# Patient Record
Sex: FEMALE | Race: WHITE | HISPANIC OR LATINO | Employment: UNEMPLOYED | URBAN - METROPOLITAN AREA
[De-identification: names, ages, dates, MRNs, and addresses within clinical notes are randomized per-mention and may not be internally consistent; named-entity substitution may affect disease eponyms.]

---

## 2023-08-25 ENCOUNTER — TELEPHONE (OUTPATIENT)
Dept: OPHTHALMOLOGY | Facility: CLINIC | Age: 28
End: 2023-08-25

## 2023-08-28 ENCOUNTER — TELEPHONE (OUTPATIENT)
Dept: OPHTHALMOLOGY | Facility: CLINIC | Age: 28
End: 2023-08-28

## 2023-09-01 PROBLEM — H33.22 RETINAL DETACHMENT, LEFT: Status: ACTIVE | Noted: 2023-09-01

## 2023-09-02 ENCOUNTER — TELEPHONE (OUTPATIENT)
Dept: OPHTHALMOLOGY | Facility: CLINIC | Age: 28
End: 2023-09-02

## 2023-09-02 NOTE — TELEPHONE ENCOUNTER
POD #1 s/p PPV/EL/AFx/5000 SO, left eye (9/1/23)  - Indication: Repeat RD OS, Breaks at 12 and 5 with total RD  - Doing well   - IOP OK   - Med instructions: start PF QID, Vigamox QID, Atropine BID, Maxitrol rakan qHS  - Discussed likely continued progression of cataract  - No vigorous activity, no lifting, bending, etc.  - Harris shield, glasses, or sunglasses when sleeping  - No shower   - Positioning instructions: Face down or Right side down  - RD/Endophthalmitis/return precautions reviewed       RTC 1 wk, sooner PRN if any problems (konstantin pain, redness, dimming or loss of vision)

## 2024-02-14 ENCOUNTER — OFFICE VISIT (OUTPATIENT)
Dept: OPHTHALMOLOGY | Facility: CLINIC | Age: 29
End: 2024-02-14

## 2024-02-14 DIAGNOSIS — H33.22 RETINAL DETACHMENT, LEFT: ICD-10-CM

## 2024-02-14 DIAGNOSIS — Z98.890 S/P EYE SURGERY: Primary | ICD-10-CM

## 2024-02-14 PROCEDURE — 99999 PR PBB SHADOW E&M-EST. PATIENT-LVL II: CPT | Mod: PBBFAC,,, | Performed by: STUDENT IN AN ORGANIZED HEALTH CARE EDUCATION/TRAINING PROGRAM

## 2024-02-14 PROCEDURE — 99212 OFFICE O/P EST SF 10 MIN: CPT | Mod: PBBFAC | Performed by: STUDENT IN AN ORGANIZED HEALTH CARE EDUCATION/TRAINING PROGRAM

## 2024-02-14 PROCEDURE — 76512 OPH US DX B-SCAN: CPT | Mod: 50,PBBFAC | Performed by: STUDENT IN AN ORGANIZED HEALTH CARE EDUCATION/TRAINING PROGRAM

## 2024-02-14 PROCEDURE — 99024 POSTOP FOLLOW-UP VISIT: CPT | Mod: ,,, | Performed by: STUDENT IN AN ORGANIZED HEALTH CARE EDUCATION/TRAINING PROGRAM

## 2024-02-14 PROCEDURE — 92134 CPTRZ OPH DX IMG PST SGM RTA: CPT | Mod: PBBFAC | Performed by: STUDENT IN AN ORGANIZED HEALTH CARE EDUCATION/TRAINING PROGRAM

## 2024-02-14 NOTE — PROGRESS NOTES
[facility] Progress Note    Encounter Date: 2/14/2024    Subjective:     Chief Complaint   Patient presents with    Eye Problem     Has noticed line in ST peripheral for past week. Moves when she moves eyes. Also sees dots floating throughout vision.         Objective:     Visual Acuity (Snellen - Linear)         Right Left    Dist cc 20/25 20/1000          Main Ophthalmology Exam       Slit Lamp Exam         Right Left    Lids/Lashes Normal Protective ptosis    Conjunctiva/Sclera White and quiet Expected post surgical appearance, JOESPH, sclerotomies closed without leak, sutures dissolving    Cornea Clear Clear    Anterior Chamber Deep and quiet Deep and quiet    Iris Round and reactive Round and pharm dilated    Lens Clear Tr PSC    Anterior Vitreous Normal Vitrectomized              Fundus Exam         Right Left    Disc  Normal    C/D Ratio  0.3    Macula  Flat    Vessels  Normal    Periphery  Flat, laser around breaks at 12 and 5, old cryo scars, good buckle height. Strand of suspected inflammatory debris within nasal/IN vitreous base with some embedded pigmentary debris, no SRF or breaks, retina attached                      Study Findings 2/14/2024:  OCT:   OS: macula flat, inferiorly and inferonasal periphery flat, prev retinotomy captured, no SRF     B scan  OS retina flat, inferonasal stranding with movement on dynamic ultrasound, no SRF        Assessment and Plan:       ICD-10-CM ICD-9-CM   1. S/P eye surgery  Z98.890 V45.69   2. Retinal detachment, left  H33.22 361.9       # Recurrent retinal detachments, left eye  - initally presented 8/2023 with mac off RD. Amblyopic eye with unclear VA potential  - s/p SB/cryo/SF6 8/11/23. Had central SRH after external drainage  - subsequently redetached  - s/p PPV/AFx/EL/5000 cst SO 9/1/23  - s/p PPV/SOR 1/26/24  - Has been doing well since SOR    - Today reports 1-2 weeks of moving line in ST peripheral vision with new point, dot-like floaters within vision. States  she sees this line in peripheral vision but normal vision on both sides of the line (no curtain or abnormal vision to one side of line)  - SDDFE without any new retinal breaks or SRF. Strand of suspected inflammatory debris within nasal/IN vitreous base with some embedded pigmentary debris. Noted on POW1 visit and appears stable today  - B scan and peripheral OCT both confirm lack of SRF or retinal detachment  - Discussed extensively with patient. Will monitor for now. If continues to persist and be visually significant and bothersome, can discuss returning to OR for PPV on elective basis  - Continue PF taper as previous. RTC as scheduled 3/1      Follow up: as scheduled 3/1/24